# Patient Record
Sex: MALE | Race: WHITE
[De-identification: names, ages, dates, MRNs, and addresses within clinical notes are randomized per-mention and may not be internally consistent; named-entity substitution may affect disease eponyms.]

---

## 2018-03-27 ENCOUNTER — HOSPITAL ENCOUNTER (EMERGENCY)
Dept: HOSPITAL 45 - C.EDB | Age: 18
Discharge: HOME | End: 2018-03-27
Payer: COMMERCIAL

## 2018-03-27 VITALS — OXYGEN SATURATION: 95 % | HEART RATE: 72 BPM | DIASTOLIC BLOOD PRESSURE: 76 MMHG | SYSTOLIC BLOOD PRESSURE: 134 MMHG

## 2018-03-27 VITALS
BODY MASS INDEX: 25.74 KG/M2 | BODY MASS INDEX: 25.74 KG/M2 | WEIGHT: 145.28 LBS | HEIGHT: 62.99 IN | WEIGHT: 145.28 LBS | HEIGHT: 62.99 IN

## 2018-03-27 VITALS — TEMPERATURE: 98.42 F

## 2018-03-27 DIAGNOSIS — S69.92XA: Primary | ICD-10-CM
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
## 2018-03-27 NOTE — DIAGNOSTIC IMAGING REPORT
L HAND MIN 3 VIEWS ROUTINE



CLINICAL HISTORY: left hand/thumb injury trauma. Pain.



COMPARISON: None.



DISCUSSION: The bones and joint spaces appear intact. There is no evidence of

fracture, dislocation or bony disease. There is no evidence for soft tissue

swelling.



IMPRESSION: Negative study.











The above report was generated using voice recognition software.  It may contain

grammatical, syntax or spelling errors.







Electronically signed by:  Kirit Orta M.D.

3/27/2018 9:49 PM



Dictated Date/Time:  3/27/2018 9:49 PM

## 2018-03-28 NOTE — EMERGENCY ROOM VISIT NOTE
ED Visit Note


First contact with patient:  20:54


CHIEF COMPLAINT: Hand injury





HISTORY OF PRESENT ILLNESS: This 17-year-old male patient presented to the 

emergency department after they injured the right hand about 30 minutes ago 

while playing basketball.  The patient rates the pain as dull and 6/10. The 

patient denies any numbness or tingling. The patient does not report injuries 

to the wrist. The patient has not had a previous fracture to this hand.





REVIEW OF SYSTEMS: A 6 system review of systems was completed with positives 

and pertinent negatives in the HPI.





ALLERGIES: No known allergies 





MEDICATIONS: No chronic medication





PMH: Otherwise healthy





SOCIAL HISTORY: Student who lives locally





PHYSICAL EXAM: Vital Signs: Reviewed Nurse's notes, vital signs stable. GENERAL

: Male, in no acute distress, but appears to be in pain, well-developed, well-

nourished. MUSCULOSKELETAL: There is no deformity of the left hand. There is 

tenderness at the base of the thumb. There is no thenar or hypothenar eminence 

atrophy. Normal thumb opposition to all fingers.  strength 1/5. There is no 

laceration. Capillary refill less than 2 seconds. No tenderness of the fingers 

or wrist. Full range of motion of the wrist. No snuff box tenderness. Radial 

pulse 2+.  NEURO: Alert and oriented to person, place, and time. Normal 

sensation to light and sharp touch. 











L HAND MIN 3 VIEWS ROUTINE





CLINICAL HISTORY: left hand/thumb injury trauma. Pain.





COMPARISON: None.





DISCUSSION: The bones and joint spaces appear intact. There is no evidence of


fracture, dislocation or bony disease. There is no evidence for soft tissue


swelling.





IMPRESSION: Negative study.











EMERGENCY DEPARTMENT COURSE: Physical exam and history were performed.  Nursing 

notes and EMR were reviewed.  The patient appears to have injured his left hand 

while playing basketball today.  X-ray was performed and reviewed by myself and 

radiology as showing no acute fracture or dislocation.  Repeat evaluation the 

patient continues to have pain at the base of the thumb.  This does not appear 

to be distinctly in the distribution of the snuffbox, however it is of concern.

  Because of this I will place the patient in a splint and have him follow with 

orthopedics.  He was otherwise invited back to the ER with any new, worsening, 

or concerning symptoms.





Allergies


Coded Allergies:  


     No Known Allergies (Unverified , 3/27/18)





Vital Signs











  Date Time  Temp Pulse Resp B/P (MAP) Pulse Ox O2 Delivery O2 Flow Rate FiO2


 


3/27/18 22:30  72 18 134/76 95 Room Air  


 


3/27/18 20:52 36.9 95 18 130/83 95 Room Air  











Medications Administered











 Medications


  (Trade)  Dose


 Ordered  Sig/Romario


 Route  Start Time


 Stop Time Status Last Admin


Dose Admin


 


 Acetaminophen


  (Tylenol Tab)  1,000 mg  NOW  STAT


 PO  3/27/18 20:59


 3/27/18 21:00 DC 3/27/18 20:59


1,000 MG


 


 Ibuprofen


  (Motrin Tab)  600 mg  NOW  STAT


 PO  3/27/18 20:59


 3/27/18 21:00 DC 3/27/18 20:59


600 MG











Departure Information


Impression





 Primary Impression:  


 Injury of left hand





Dispostion


Home / Self-Care





Condition


GOOD





Referrals


Tommy Adame MD





Forms


HOME CARE DOCUMENTATION FORM,                                                 

               IMPORTANT VISIT INFORMATION





Patient Instructions


[facility]





Additional Instructions





You were seen and evaluated today on an emergency basis only.  This is not a 

substitute for, or an effort to provide, complete comprehensive medical care.  

It is not possible to recognize and treat all injuries or illnesses in a single 

emergency department visit. 





For this reason it is recommended that you followup with orthopedics, Dr. Adame's office,  in one to two weeks if symptoms persist. 





For baseline pain relief you may alternate ibuprofen and acetaminophen every 4 

hours for pain control. Take 600 mg ibuprofen (Advil) and then 4 hours later 

take 1000 mg acetaminophen (Tylenol). Do not take more than 3000 mg 

acetaminophen in a single day.





Where your wrist brace for comfort.





You are welcome to return to the emergency department anytime with new, 

worsening, or concerning symptoms.